# Patient Record
Sex: MALE | Race: WHITE
[De-identification: names, ages, dates, MRNs, and addresses within clinical notes are randomized per-mention and may not be internally consistent; named-entity substitution may affect disease eponyms.]

---

## 2017-03-17 ENCOUNTER — HOSPITAL ENCOUNTER (OUTPATIENT)
Dept: HOSPITAL 45 - X.SURG | Age: 37
Discharge: HOME | End: 2017-03-17
Attending: UROLOGY
Payer: COMMERCIAL

## 2017-03-17 VITALS
BODY MASS INDEX: 25.82 KG/M2 | WEIGHT: 180.38 LBS | HEIGHT: 70 IN | HEIGHT: 70 IN | BODY MASS INDEX: 25.82 KG/M2 | WEIGHT: 180.38 LBS

## 2017-03-17 VITALS — OXYGEN SATURATION: 100 % | HEART RATE: 62 BPM | SYSTOLIC BLOOD PRESSURE: 112 MMHG | DIASTOLIC BLOOD PRESSURE: 76 MMHG

## 2017-03-17 VITALS — TEMPERATURE: 96.98 F

## 2017-03-17 DIAGNOSIS — N20.0: Primary | ICD-10-CM

## 2017-03-17 DIAGNOSIS — G43.909: ICD-10-CM

## 2017-03-17 DIAGNOSIS — N20.1: ICD-10-CM

## 2017-03-17 NOTE — ANESTHESIA PROGRESS NT - [facility]
Anesthesia Post Op Note


Date & Time


Mar 17, 2017 at 09:18





Vital Signs


Pain Intensity:  0





 Vital Signs Past 12 Hours








  Date Time  Temp Pulse Resp B/P Pulse Ox O2 Delivery O2 Flow Rate FiO2


 


3/17/17 08:44 36.1 60 18 118/80 100 Room Air  


 


3/17/17 08:31    112/79    


 


3/17/17 08:30 36.4 62 12 112/79 98 Room Air  


 


3/17/17 08:28  60 13     


 


3/17/17 08:28  60 13  98   


 


3/17/17 08:26    115/80    


 


3/17/17 08:23  64 15     


 


3/17/17 08:23  60 15  100   


 


3/17/17 08:21    107/73    


 


3/17/17 08:18  59 11     


 


3/17/17 08:18  58 11  99   


 


3/17/17 08:16    118/80    


 


3/17/17 08:13  54 12  99   


 


3/17/17 08:13  57 12     


 


3/17/17 08:11    117/78    


 


3/17/17 08:08 36.4 78 12 115/81 99 Room Air  


 


3/17/17 08:08  75      


 


3/17/17 08:08  75  115/81 99   


 


3/17/17 06:40 36.5 70 16 117/76 97 Room Air  











Notes


Mental Status:  alert / awake / arousable, participated in evaluation


Pt Amnestic to Procedure:  Yes


Nausea / Vomiting:  adequately controlled


Pain:  adequately controlled


Airway Patency, RR, SpO2:  stable & adequate


BP & HR:  stable & adequate


Hydration State:  stable & adequate


Anesthetic Complications:  no major complications apparent

## 2017-03-17 NOTE — [facility] HISTORY AND PHYSICAL
History


General


Date of Service:


Mar 17, 2017.


Chief Complaint:  right kidney stone


Primary Care Physician:


Yohan Parra D.O.


Pt seen a urologist before?:  Yes


If yes, why?:  r kidney stone





History of Present Illness


Today patient presents for treatment of right kidney stone.  He has had others 

he passed on his own.  He has a 7mm largest right renal stone.





Imaging


Imaging:  KUB





Laboratory


Labs were reviewed and are within normal limits unless listed below. Labs are 

available in the chart and at [facility]





Problem List


Medical Problems:


(1) Right ureteral calculus


Status: Acute  











Past History


Past Medical History:  migraines


Past Surgical History:  no surgical history





Family History


no pertinent family hx





Social History


Hx Tobacco Use In Past Year?:  No (QUIT 1 YEAR AGO)


Smoking Status:  Former Smoker


Alcohol:  never


Marital status:  


Occupation status:  unemployed





Allergies


Coded Allergies:  


     No Known Allergies (Unverified , 3/17/17)





Medications


Home Medications:





Home Meds and Scripts








 Medications  Dose


 Route/Sig


 Max Daily Dose Days Date Category


 


 Propranolol Hcl


 40 Mg Tab  1 Tab


 PO BID


    12/8/16 Reported








Inpatient Medications:





 Current Inpatient Medications








 Medications


  (Trade)  Dose


 Ordered  Sig/Denise


 Route  Start Time


 Stop Time Status Last Admin


Dose Admin


 


 Lactated Ringer's


  (Lr 1000ml)  1,000 ml @ 


 15 mls/hr  Q24H


 IV  3/17/17 06:45


 3/18/17 06:44   


 











Review of Systems


Review of Systems


Constitutional:  No fever


Gastrointestinal:  + abdominal pain


Cardiovascular:  No chest pain


Respiratory:  No shortness of breath





Physical Exam


Physical Exam:


General Appearance:  WD/WN, no apparent distress, + thin


Eyes:  bilateral eyes normal inspection


ENT:  hearing grossly normal


Respiratory/Chest:  lungs clear, normal breath sounds, no respiratory distress, 

no accessory muscle use


Cardiovascular:  regular rate, rhythm, no JVD, no murmur


Extremities:  non-tender, normal inspection, no pedal edema, no calf tenderness


Neurologic/Psychiatric:  alert, normal mood/affect, oriented x 3


Skin:  normal color, warm/dry, no rash





Assessment & Plan


Assessment & Plan


Assessment & Plan:


right kidney stones


plan right shock wave lithotripsy


He understands risks of bleeding failure pain kidney bruise.

## 2017-03-17 NOTE — [facility] OPERATIVE REPORT
Operative Report


Operative Date


Mar 17, 2017.





Pre-Operative Diagnosis





Right Renal Calculi





Post-Operative Diagnosis


same





Procedure(s) Performed


right shock wave lithotripsy





Surgeon


Dr. Salazar





Assistant Surgeon(s)


None





Estimated Blood Loss


0 mL





Findings


radio-opaque right kidney 7mm renal stone





Specimens





None





Drains


none





Anesthesia


LMA





Complication(s)


None





Disposition


Recovery Room / PACU





Indications


large right renal stone 


plan treatment as size makes it unlikely to pass





Description of Procedure


Patient was placed supine on OR table.  Fluoro confirmed stone was readily 

visible.  He had general LMA anesthesia and was placed right flank against the 

lithotriptor head.  Time out held with team.  We performed right ESWL up to 

2500 shocks aimed at the right renal stone. It lightened marginally during 

case.  We observed a 2 minute pause after the first 200 shocks and then 

gradually increased the power from 2 to 4.  At end of case he has very mild 

petechia at the right flank energy treatment site.  He extubated uneventfully 

and transferred in stable condition to recovery room.  





Plan:





home today


oral pain meds as needed.  


strain urine until a few fragments are recovered.


rtc 1 month with KUB





clean case


no antibiotic on call


asa 2


I attest to the content of the Intraoperative Record and any orders documented 

therein.  Any exceptions are noted below.

## 2017-03-17 NOTE — DISCHARGE INSTRUCTIONS
Discharge Instructions


Date of Service


Mar 17, 2017.





Admission


Reason for Admission:  Right Kidney Stone





Discharge


Discharge Diagnosis / Problem:  right kidney stones





Discharge Goals


Goal(s):  Decrease discomfort, Improve disease control





Activity Recommendations


Activity Limitations:  resume your previous activity


Lifting Limitations:  none


Exercise/Sports Limitations:  gradually increase as tolerated


May Resume Sexual Activity:  when tolerated


Shower/Bathe:  no limitations


Driving or Machine Use:  resume 1 day after discharge





.





Instructions / Follow-Up


Instructions / Follow-Up


expect blood in urine for days to weeks


expect moderate pain right flank for several days


go to ER for uncontrolled pain, continuous vomiting, or fever over 100F


Use tylenol or ibuprofen every 6 hours for mild pain


use oxycodone every 6 hours for moderate pain





Discharge Diet


Recommended Diet:  Regular Diet


Fluid Restriction:  None





Procedures


Procedures Performed:  


Right Extracorporeal Shock Wave Lithotripsy





Pending Studies


Studies pending at discharge:  no





Medical Emergencies








.


Who to Call and When:





Medical Emergencies:  If at any time you feel your situation is an emergency, 

please call 911 immediately.





.





Non-Emergent Contact


Non-Emergency issues call your:  Urologist ()


Call Non-Emergent contact if:  temperature is above 100.5, your pain is not 

controlled





.


.





"Provider Documentation" section prepared by Jaleesa Salazar.





VTE Core Measure


Inpt VTE Proph given/why not?:  SCD's

## 2018-08-01 ENCOUNTER — HOSPITAL ENCOUNTER (INPATIENT)
Dept: HOSPITAL 45 - C.EDB | Age: 38
LOS: 2 days | Discharge: HOME | DRG: 872 | End: 2018-08-03
Attending: FAMILY MEDICINE | Admitting: FAMILY MEDICINE
Payer: COMMERCIAL

## 2018-08-01 VITALS
HEART RATE: 101 BPM | OXYGEN SATURATION: 97 % | TEMPERATURE: 98.42 F | DIASTOLIC BLOOD PRESSURE: 87 MMHG | SYSTOLIC BLOOD PRESSURE: 131 MMHG

## 2018-08-01 VITALS
HEIGHT: 70 IN | BODY MASS INDEX: 26.67 KG/M2 | WEIGHT: 186.29 LBS | BODY MASS INDEX: 26.67 KG/M2 | WEIGHT: 186.29 LBS | HEIGHT: 70 IN

## 2018-08-01 DIAGNOSIS — G43.909: ICD-10-CM

## 2018-08-01 DIAGNOSIS — Z91.048: ICD-10-CM

## 2018-08-01 DIAGNOSIS — Z83.6: ICD-10-CM

## 2018-08-01 DIAGNOSIS — A41.9: Primary | ICD-10-CM

## 2018-08-01 DIAGNOSIS — D75.9: ICD-10-CM

## 2018-08-01 DIAGNOSIS — E11.65: ICD-10-CM

## 2018-08-01 DIAGNOSIS — Z82.49: ICD-10-CM

## 2018-08-01 DIAGNOSIS — J36: ICD-10-CM

## 2018-08-01 DIAGNOSIS — Z83.3: ICD-10-CM

## 2018-08-01 LAB
ALBUMIN SERPL-MCNC: 4.2 GM/DL (ref 3.4–5)
ALP SERPL-CCNC: 88 U/L (ref 45–117)
ALT SERPL-CCNC: 27 U/L (ref 12–78)
AST SERPL-CCNC: 17 U/L (ref 15–37)
BASOPHILS # BLD: 0.02 K/UL (ref 0–0.2)
BASOPHILS NFR BLD: 0.1 %
BUN SERPL-MCNC: 16 MG/DL (ref 7–18)
CALCIUM SERPL-MCNC: 8.8 MG/DL (ref 8.5–10.1)
CO2 SERPL-SCNC: 26 MMOL/L (ref 21–32)
CREAT SERPL-MCNC: 1.3 MG/DL (ref 0.6–1.4)
EOS ABS #: 0.01 K/UL (ref 0–0.5)
EOSINOPHIL NFR BLD AUTO: 97 K/UL (ref 130–400)
GLUCOSE SERPL-MCNC: 114 MG/DL (ref 70–99)
HCT VFR BLD CALC: 45.8 % (ref 42–52)
HGB BLD-MCNC: 15.7 G/DL (ref 14–18)
IG#: 0.07 K/UL (ref 0–0.02)
IMM GRANULOCYTES NFR BLD AUTO: 8.6 %
LYMPHOCYTES # BLD: 1.48 K/UL (ref 1.2–3.4)
MCH RBC QN AUTO: 27.6 PG (ref 25–34)
MCHC RBC AUTO-ENTMCNC: 34.3 G/DL (ref 32–36)
MCV RBC AUTO: 80.6 FL (ref 80–100)
MONO ABS #: 1.13 K/UL (ref 0.11–0.59)
MONOCYTES NFR BLD: 6.5 %
NEUT ABS #: 14.59 K/UL (ref 1.4–6.5)
NEUTROPHILS # BLD AUTO: 0.1 %
NEUTROPHILS NFR BLD AUTO: 84.3 %
PMV BLD AUTO: 10.1 FL (ref 7.4–10.4)
POTASSIUM SERPL-SCNC: 3.9 MMOL/L (ref 3.5–5.1)
PROT SERPL-MCNC: 8 GM/DL (ref 6.4–8.2)
RED CELL DISTRIBUTION WIDTH CV: 13.5 % (ref 11.5–14.5)
RED CELL DISTRIBUTION WIDTH SD: 39.7 FL (ref 36.4–46.3)
SODIUM SERPL-SCNC: 136 MMOL/L (ref 136–145)
WBC # BLD AUTO: 17.3 K/UL (ref 4.8–10.8)

## 2018-08-01 RX ADMIN — ALUMINUM ZIRCONIUM TRICHLOROHYDREX GLY SCH EA: 0.2 STICK TOPICAL at 23:33

## 2018-08-01 NOTE — DIAGNOSTIC IMAGING REPORT
CHEST ONE VIEW PORTABLE



CLINICAL HISTORY: Fever, sepsis    



COMPARISON STUDY:  No previous studies for comparison.



FINDINGS: The cardiac and mediastinal contours are normal. There is no evidence

of focal pulmonary consolidation. There is no evidence of failure. No pleural

effusions are visualized.[ 



IMPRESSION: No active disease in the chest.







Electronically signed by:  Jony Villalta M.D.

8/1/2018 6:47 PM



Dictated Date/Time:  8/1/2018 6:46 PM

## 2018-08-01 NOTE — DIAGNOSTIC IMAGING REPORT
CT SOFT TISSUE NECK WITH



CT DOSE: 432.67 mGy.cm



CLINICAL HISTORY: Sore throat and fever. Possible right-sided peritonsillar

abscess.    



TECHNIQUE: Helical images were acquired during intravenous administration of 115

cc of Optiray 320.  A dose lowering technique was utilized adhering to the

principles of ALARA.



COMPARISON STUDY:  None.



FINDINGS: The visualized portions of the lung apices are unremarkable.

No thyroid masses are visualized.

No salivary gland masses are visualized.

There are enlarged right-sided level 2 lymph nodes, likely reactive.

There is a subtle 11 mm right tonsillar hypodensity, consistent with a

developing phlegmon/abscess.

There is no evidence of airway compromise.

The epiglottis appears normal.





IMPRESSION:  

1. Subtle 11 mm right tonsillar hypodensity consistent with a developing

phlegmon/abscess

2. No evidence of significant airway compromise

3. Mildly enlarged right cervical lymph nodes, likely reactive.

4. Normal epiglottis







Electronically signed by:  Jony Villalta M.D.

8/1/2018 8:47 PM



Dictated Date/Time:  8/1/2018 8:43 PM

## 2018-08-01 NOTE — EMERGENCY ROOM VISIT NOTE
History


Report prepared by Carson:  Anita Holland


Under the Supervision of:  Dr. Tyrel Segura M.D.


First contact with patient:  18:00


Chief Complaint:  ILLNESS


Stated Complaint:  COLD, SORE THROAT, SHAKING





History of Present Illness


The patient is a 38 year old male who presents to the Emergency Room with 

complaints of a constant sore throat beginning yesterday. He notes the pain is 

worst on the R side of his throat, and is accompanied by shaking and some mild 

ear pain. The patient denies a runny nose, cough, difficulty breathing, nausea, 

vomiting, or bowel movement or urinary symptoms. He reports he has been taking 

Tylenol for his symptoms, and last had it 6 hours ago. The patient notes he is 

having some difficulty swallowing. Decreased PO intake. No trauma. No thyroid 

history.





   Source of History:  patient


   Onset:  yesterday


   Position:  throat


   Quality:  other (sore throat)


   Timing:  constant


   Associated Symptoms:  No cough, No nausea, No urinary symptoms


Note:


Associated symptoms: mild ear pain, difficulty swallowing. Denies: runny nose, 

difficulty breathing, bowel movement symptoms.





Review of Systems


See HPI for pertinent positives and negatives.  A total of ten systems were 

reviewed and were otherwise negative.





Past Medical & Surgical


Medical Problems:


(1) Migraine Unspecified W/O Intract Mgrn W/O Status Migrainosus


(2) Renal colic on right side


(3) Sepsis








Family History





Diabetes mellitus


FH: cancer


FH: heart disease


Hypertension


Kidney disease





Social History


Smoking Status:  Never Smoker


Alcohol Use:  none


Marital Status:  


Housing Status:  lives with family


Occupation Status:  unemployed





Current/Historical Medications


Scheduled PRN


Acetaminophen (Tylenol), 500 MG PO UD PRN for Pain or Fever


Eletriptan Hydrobromide (Eletriptan Hydrobromide), 40 MG PO UD PRN for Migraine





Allergies


Coded Allergies:  


     No Known Allergies (Unverified , 3/17/17)





Physical Exam


Vital Signs











  Date Time  Temp Pulse Resp B/P (MAP) Pulse Ox O2 Delivery O2 Flow Rate FiO2


 


8/1/18 21:36 37.7 101 18 141/79 96 Room Air  


 


8/1/18 20:24  115 20 121/74 96 Room Air  


 


8/1/18 20:24 39.0       


 


8/1/18 19:27 39.7       


 


8/1/18 19:13  132 26 117/77 96 Room Air  


 


8/1/18 18:58  136 24  96 Room Air  


 


8/1/18 18:24      Room Air  


 


8/1/18 17:55 39.4 154 20 138/79 96 Room Air  











Physical Exam


GENERAL: Awake, alert, well-appearing, appears moderately uncomfortable


HENT: Normocephalic, atraumatic. R tonsillar swelling and exudate. 


EYES: Normal conjunctiva. Sclera non-icteric.


NECK: Supple with slight right tenderness at angle of right jaw. No nuchal 

rigidity. 


RESPIRATORY: Clear to auscultation. No wheezes. Normal respiratory effort.


CARDIAC: Tachycardic rate.  Normal rhythm. Extremities warm and well perfused.


GI: Soft, non-distended. No tenderness to palpation. No rebound or guarding. No 

masses.


RECTAL: Deferred.


MUSCULOSKELETAL: Atraumatic. Chest examination reveals no tenderness. There is 

no CVA tenderness to palpation. 


LOWER EXTREMITIES: Calves are equal size bilaterally and non-tender. No edema


NEURO: Normal sensorium. No sensory or motor deficits noted. No facial droop.


SKIN: Warm and dry. No rash or jaundice noted.





Medical Decision & Procedures


ER Provider


Diagnostic Interpretation:


Radiology results as stated below per my review and radiologist interpretation: 





CT SOFT TISSUE NECK WITH





CT DOSE: 432.67 mGy.cm





CLINICAL HISTORY: Sore throat and fever. Possible right-sided peritonsillar


abscess.    





TECHNIQUE: Helical images were acquired during intravenous administration of 115


cc of Optiray 320.  A dose lowering technique was utilized adhering to the


principles of ALARA.





COMPARISON STUDY:  None.





FINDINGS: The visualized portions of the lung apices are unremarkable.


No thyroid masses are visualized.


No salivary gland masses are visualized.


There are enlarged right-sided level 2 lymph nodes, likely reactive.


There is a subtle 11 mm right tonsillar hypodensity, consistent with a


developing phlegmon/abscess.


There is no evidence of airway compromise.


The epiglottis appears normal.





IMPRESSION:  


1. Subtle 11 mm right tonsillar hypodensity consistent with a developing


phlegmon/abscess


2. No evidence of significant airway compromise


3. Mildly enlarged right cervical lymph nodes, likely reactive.


4. Normal epiglottis





Electronically signed by:  Jony Villalta M.D.


8/1/2018 8:47 PM





Dictated Date/Time:  8/1/2018 8:43 PM





CHEST ONE VIEW PORTABLE





CLINICAL HISTORY: Fever, sepsis    





COMPARISON STUDY:  No previous studies for comparison.





FINDINGS: The cardiac and mediastinal contours are normal. There is no evidence


of focal pulmonary consolidation. There is no evidence of failure. No pleural


effusions are visualized.





IMPRESSION: No active disease in the chest.





Electronically signed by:  Jony Villalta M.D.


8/1/2018 6:47 PM





Dictated Date/Time:  8/1/2018 6:46 PM





Laboratory Results


8/1/18 18:10








Red Blood Count 5.68, Mean Corpuscular Volume 80.6, Mean Corpuscular Hemoglobin 

27.6, Mean Corpuscular Hemoglobin Concent 34.3, Mean Platelet Volume 10.1, 

Neutrophils (%) (Auto) 84.3, Lymphocytes (%) (Auto) 8.6, Monocytes (%) (Auto) 

6.5, Eosinophils (%) (Auto) 0.1, Basophils (%) (Auto) 0.1, Neutrophils # (Auto) 

14.59, Lymphocytes # (Auto) 1.48, Monocytes # (Auto) 1.13, Eosinophils # (Auto) 

0.01, Basophils # (Auto) 0.02





8/1/18 18:10

















Test


  8/1/18


18:10 8/1/18


21:45


 


White Blood Count


  17.30 K/uL


(4.8-10.8) 


 


 


Red Blood Count


  5.68 M/uL


(4.7-6.1) 


 


 


Hemoglobin


  15.7 g/dL


(14.0-18.0) 


 


 


Hematocrit 45.8 % (42-52)  


 


Mean Corpuscular Volume


  80.6 fL


() 


 


 


Mean Corpuscular Hemoglobin


  27.6 pg


(25-34) 


 


 


Mean Corpuscular Hemoglobin


Concent 34.3 g/dl


(32-36) 


 


 


Platelet Count


  97 K/uL


(130-400) 


 


 


Mean Platelet Volume


  10.1 fL


(7.4-10.4) 


 


 


Neutrophils (%) (Auto) 84.3 %  


 


Lymphocytes (%) (Auto) 8.6 %  


 


Monocytes (%) (Auto) 6.5 %  


 


Eosinophils (%) (Auto) 0.1 %  


 


Basophils (%) (Auto) 0.1 %  


 


Neutrophils # (Auto)


  14.59 K/uL


(1.4-6.5) 


 


 


Lymphocytes # (Auto)


  1.48 K/uL


(1.2-3.4) 


 


 


Monocytes # (Auto)


  1.13 K/uL


(0.11-0.59) 


 


 


Eosinophils # (Auto)


  0.01 K/uL


(0-0.5) 


 


 


Basophils # (Auto)


  0.02 K/uL


(0-0.2) 


 


 


RDW Standard Deviation


  39.7 fL


(36.4-46.3) 


 


 


RDW Coefficient of Variation


  13.5 %


(11.5-14.5) 


 


 


Immature Granulocyte % (Auto) 0.4 %  


 


Immature Granulocyte # (Auto)


  0.07 K/uL


(0.00-0.02) 


 


 


Platelet Estimate DECREASED  


 


Red Blood Cell Morphology Unremarkable  


 


Anion Gap


  7.0 mmol/L


(3-11) 


 


 


Est Creatinine Clear Calc


Drug Dose 81.1 ml/min 


  


 


 


Estimated GFR (


American) 81.4 


  


 


 


Estimated GFR (Non-


American 70.2 


  


 


 


BUN/Creatinine Ratio 11.9 (10-20)  


 


Calcium Level


  8.8 mg/dl


(8.5-10.1) 


 


 


Magnesium Level


  2.1 mg/dl


(1.8-2.4) 


 


 


Total Bilirubin


  0.7 mg/dl


(0.2-1) 


 


 


Direct Bilirubin


  0.2 mg/dl


(0-0.2) 


 


 


Aspartate Amino Transf


(AST/SGOT) 17 U/L (15-37) 


  


 


 


Alanine Aminotransferase


(ALT/SGPT) 27 U/L (12-78) 


  


 


 


Alkaline Phosphatase


  88 U/L


() 


 


 


C-Reactive Protein


  16.80 mg/dl


(0-0.29) 


 


 


Total Protein


  8.0 gm/dl


(6.4-8.2) 


 


 


Albumin


  4.2 gm/dl


(3.4-5.0) 


 


 


Thyroid Stimulating Hormone


(TSH) 0.340 uIu/ml


(0.300-4.500) 


 


 


Monoscreen NEG (NEG)  


 


Lactic Acid Level


  


  0.7 mmol/L


(0.4-2.0)





Laboratory results reviewed by me





Medications Administered











 Medications


  (Trade)  Dose


 Ordered  Sig/Denise


 Route  Start Time


 Stop Time Status Last Admin


Dose Admin


 


 Acetaminophen


  (Tylenol Tab)  1,000 mg  NOW  STAT


 PO  8/1/18 18:04


 8/1/18 18:08 DC 8/1/18 18:23


1,000 MG


 


 Sodium Chloride  500 ml @ 


 999 mls/hr  Q31M ONCE


 IV  8/1/18 18:04


 8/1/18 18:34 DC 8/1/18 18:04


999 MLS/HR


 


 Sodium Chloride  500 ml @ 


 999 mls/hr  Q31M ONCE


 IV  8/1/18 18:04


 8/1/18 18:34 DC 8/1/18 18:04


999 MLS/HR


 


 Ampicillin Sodium/


 Sulbactam Sodium


 3000 mg/Sodium


 Chloride  108 ml @ 


 200 mls/hr  NOW  STAT


 IV  8/1/18 18:45


 8/1/18 19:17 DC 8/1/18 19:14


200 MLS/HR


 


 Ketorolac


 Tromethamine


  (Toradol Inj)  15 mg  NOW  STAT


 IV  8/1/18 19:33


 8/1/18 19:34 DC 8/1/18 19:40


15 MG


 


 Dexamethasone


 Sodium Phosphate


  (Dexamethasone


 Inj **Pf**)  10 mg  NOW  ONCE


 PO  8/1/18 21:30


 8/1/18 21:32 DC 8/1/18 21:36


10 MG


 


 Sodium Chloride  1,000 ml @ 


 999 mls/hr  Q1H1M ONCE


 IV  8/1/18 21:32


 8/1/18 22:32 DC 8/1/18 21:36


999 MLS/HR











ECG Per My Interpretation


Indication:  tachycardia


Rate (beats per minute):  144


Findings:  other (normal axis. normal intercals. no ST el)





ED Course


1800: The patient was evaluated in room C1B. A complete history and physical 

exam was performed.





1804: Ordered Sodium Chloride 500 ml @ 999 mls/hr IV,  Sodium Chloride 500 ml @ 

999 mls/hr IV, Tylenol Tab 1000 mg PO





1815: Ordered Ioversol 100 ml IV





1827: Throat swabs obtained. 





1845: Ordered Ampicillin Sodium/Sulbactam Sodium 3000 mg/Sodium Chloride 108 ml 

@ 200 mls/hr IV





1928: I reevaluated and updated the patient.





1933: Ordered Toradol Inj 15 mg IV.





2116: I reevaluated and updated the patient. 





2130: Ordered Dexamethasone Sodium Phosphate 10 mg PO.





2132: Ordered Sodium Chloride 1000 ml @ 999 mls/hr IV.





2136: I discussed the case with Dr. Chisholm, [facility] hospitalist. The patient 

will be evaluated for further treatment and disposition.





Medical Decision


Etiologies such as viral syndrome, tonsillitis, streptococcal pharyngitis, 

mononucleosis, peritonsillar abscess, retropharyngeal abscess, otitis, pneumonia

, influenza, as well as others were entertained.





Patient presents with neck pain and fever starting yesterday.  Evidence of 

right tonsillar swelling.  No nausea vomiting or shortness of breath.  Does not 

appear grossly meningitic.  Tachycardic.  Lactate borderline elevation.  

Believe he likely has pharyngitis versus neck infection versus PTA. Doubt 

meningitis. Chest x-ray clear.  Benign abdomen.  Fluid rehydrated.  Culture 

sent.  Rapid strep and mono sent.  Mono negative.  Leukocytosis is present.  

Given Tylenol and Toradol for fever.  Patient's fever improved slightly and 

tachycardia improved slightly and he is more comfortable but still febrile and 

tachycardic with leukocytosis.  Has signs of sepsis but not organ dysfunction. 

Second lactate improved. Not in septic shock.  Given steroids and again 

received Unasyn.  Will monitor overnight as he developing right peritonsillar 

phlegmon/early abscess.  This is not amenable to draining at this time.  No 

signs of airway compromise.  Will admit overnight and the hospitalist was 

contacted.





Medication Reconcilliation


Current Medication List:  was personally reviewed by me





Blood Pressure Screening


Patient's blood pressure:  Normal blood pressure


Blood pressure disposition:  Did not require urgent referral





Consults


Time Called:  2131


Consulting Physician:  Jay Butcher hospitalist


Returned Call:  [phone]


I discussed the case with Jay Butcher [facility] Hospitalist. The patient will 

be evaluated for further treatment and disposition.





Impression





 Primary Impression:  


 Peritonsillar abscess


 Additional Impression:  


 Sepsis





Critical Care


I have personally spent greater than 31 minutes of critical care time in the 

direct management of this patient.  This includes bedside care, interpretation 

of diagnostic studies, and testing, discussion with consultants, patient, and 

family members, and other required patient management activities.  This 31 

minutes is in excess of all separately billable procedures.





Scribe Attestation


The scribe's documentation has been prepared under my direction and personally 

reviewed by me in its entirety. I confirm that the note above accurately 

reflects all work, treatment, procedures, and medical decision making performed 

by me.





Departure Information


Dispostion


Being Evaluated By Hospitalist





Referrals


Yohan Parra D.OCristofer (PCP)





Patient Instructions


My [facility]





Sepsis


Post Crystalloid Evaluation


Date:  Aug 1, 2018


Time:  21:16





Capillary Refill Exam


Normal (less than 2 seconds)





Cardiopulmonary Evaluation


Lung Exam:  chest non-tender, lungs clear, normal breath sounds, no respiratory 

distress, no accessory muscle use


Heart Exam:  no edema, no gallop, no JVD, no murmur, normal peripheral pulses, 

+ tachycardia





Central Venous Evaluation


Pulse Ox %:  96





Peripheral Pulse Evaluation


Bounding





Skin Exam


Pink





Vitals





Last Vital Signs Documentation








  Date Time  Temp Pulse Resp B/P (MAP) Pulse Ox O2 Delivery O2 Flow Rate FiO2


 


8/1/18 21:36 37.7 101 18 141/79 96 Room Air  











Problem Qualifiers








 Additional Impression:  


 Sepsis


 Sepsis type:  sepsis due to unspecified organism  Qualified Codes:  A41.9 - 

Sepsis, unspecified organism

## 2018-08-02 VITALS
DIASTOLIC BLOOD PRESSURE: 74 MMHG | OXYGEN SATURATION: 97 % | SYSTOLIC BLOOD PRESSURE: 120 MMHG | HEART RATE: 91 BPM | TEMPERATURE: 97.88 F

## 2018-08-02 VITALS
TEMPERATURE: 98.06 F | OXYGEN SATURATION: 97 % | DIASTOLIC BLOOD PRESSURE: 78 MMHG | HEART RATE: 94 BPM | SYSTOLIC BLOOD PRESSURE: 109 MMHG

## 2018-08-02 VITALS
TEMPERATURE: 98.6 F | OXYGEN SATURATION: 98 % | HEART RATE: 89 BPM | DIASTOLIC BLOOD PRESSURE: 82 MMHG | SYSTOLIC BLOOD PRESSURE: 130 MMHG

## 2018-08-02 VITALS — OXYGEN SATURATION: 97 %

## 2018-08-02 LAB
BASOPHILS # BLD: 0.01 K/UL (ref 0–0.2)
BASOPHILS NFR BLD: 0.1 %
CREAT SERPL-MCNC: 0.89 MG/DL (ref 0.6–1.4)
EOS ABS #: 0 K/UL (ref 0–0.5)
EOSINOPHIL NFR BLD AUTO: 78 K/UL (ref 130–400)
HBA1C MFR BLD: 5 % (ref 4.5–5.6)
HCT VFR BLD CALC: 40.8 % (ref 42–52)
HGB BLD-MCNC: 13.9 G/DL (ref 14–18)
IG#: 0.07 K/UL (ref 0–0.02)
IMM GRANULOCYTES NFR BLD AUTO: 4.6 %
LYMPHOCYTES # BLD: 0.78 K/UL (ref 1.2–3.4)
MCH RBC QN AUTO: 27.6 PG (ref 25–34)
MCHC RBC AUTO-ENTMCNC: 34.1 G/DL (ref 32–36)
MCV RBC AUTO: 81 FL (ref 80–100)
MONO ABS #: 0.6 K/UL (ref 0.11–0.59)
MONOCYTES NFR BLD: 3.5 %
NEUT ABS #: 15.59 K/UL (ref 1.4–6.5)
NEUTROPHILS # BLD AUTO: 0 %
NEUTROPHILS NFR BLD AUTO: 91.4 %
PMV BLD AUTO: 9.6 FL (ref 7.4–10.4)
RED CELL DISTRIBUTION WIDTH CV: 13.8 % (ref 11.5–14.5)
RED CELL DISTRIBUTION WIDTH SD: 40.6 FL (ref 36.4–46.3)
WBC # BLD AUTO: 17.05 K/UL (ref 4.8–10.8)

## 2018-08-02 RX ADMIN — ALUMINUM ZIRCONIUM TRICHLOROHYDREX GLY SCH EA: 0.2 STICK TOPICAL at 08:00

## 2018-08-02 RX ADMIN — AMPICILLIN SODIUM AND SULBACTAM SODIUM SCH MLS/HR: 2; 1 INJECTION, POWDER, FOR SOLUTION INTRAMUSCULAR; INTRAVENOUS at 07:40

## 2018-08-02 RX ADMIN — AMPICILLIN SODIUM AND SULBACTAM SODIUM SCH MLS/HR: 2; 1 INJECTION, POWDER, FOR SOLUTION INTRAMUSCULAR; INTRAVENOUS at 20:10

## 2018-08-02 RX ADMIN — AMPICILLIN SODIUM AND SULBACTAM SODIUM SCH MLS/HR: 2; 1 INJECTION, POWDER, FOR SOLUTION INTRAMUSCULAR; INTRAVENOUS at 13:38

## 2018-08-02 RX ADMIN — ALUMINUM ZIRCONIUM TRICHLOROHYDREX GLY SCH EA: 0.2 STICK TOPICAL at 16:00

## 2018-08-02 RX ADMIN — KETOROLAC TROMETHAMINE PRN MG: 15 INJECTION INTRAMUSCULAR; INTRAVENOUS at 05:46

## 2018-08-02 RX ADMIN — AMPICILLIN SODIUM AND SULBACTAM SODIUM SCH MLS/HR: 2; 1 INJECTION, POWDER, FOR SOLUTION INTRAMUSCULAR; INTRAVENOUS at 01:41

## 2018-08-02 RX ADMIN — ALUMINUM ZIRCONIUM TRICHLOROHYDREX GLY SCH EA: 0.2 STICK TOPICAL at 23:53

## 2018-08-02 NOTE — PROGRESS NOTE
Subjective


Date of Service:


Aug 2, 2018.


Subjective


Pt evaluation today including:  conversation w/ patient, physical exam, lab 

review, review of studies, conversation w/ consultant, review of inpatient 

medication list


Saw/examined the patient in room 454


He's doing well, no problems/issues at this time


throat pain improving; states he's had throat pain once or twice every year and 

is wondering about getting his tonsils removed


fevers improved, tolerated clears





Problem List


Medical Problems:


(1) Peritonsillar abscess


Status: Acute  





(2) Right ureteral calculus


Status: Acute  











Review of Systems


Constitutional:  No fever (resolved), No chills


ENT:  + sore throat (improving), + trouble swallowing


Respiratory:  No cough, No sputum, No wheezing, No shortness of breath, No 

dyspnea on exertion, No dyspnea at rest, No hemoptysis


Cardiac:  No chest pain, No edema, No palpitations


Abdomen:  No pain, No nausea, No vomiting, No diarrhea, No constipation, No GI 

bleeding





Medications





Current Inpatient Medications








 Medications


  (Trade)  Dose


 Ordered  Sig/Denise


 Route  Start Time


 Stop Time Status Last Admin


Dose Admin


 


 Ioversol


  (Optiray 320)  100 ml  UD  PRN


 IV  8/1/18 18:15


 8/5/18 18:14   


 


 


 Acetaminophen


  (Tylenol Tab)  650 mg  Q4H  PRN


 PO  8/1/18 22:30


 8/31/18 22:29   


 


 


 Ampicillin Sodium/


 Sulbactam Sodium


  (Consult)  1 ea  UD  PRN


 N/A  8/1/18 23:00


 8/31/18 22:59   


 


 


 Ketorolac


 Tromethamine


  (Toradol Inj)  15 mg  Q6H  PRN


 IV.  8/1/18 22:30


 8/6/18 22:29  8/2/18 05:46


15 MG


 


 Ibuprofen


  (Advil Tab)  400 mg  Q6H  PRN


 PO  8/1/18 22:30


 8/31/18 22:29   


 


 


 Prochlorperazine


 Edisylate 5 mg/


 Syringe  5 ml @ 5


 mls/min  Q6H  PRN


 IV  8/1/18 22:30


 8/31/18 22:29   


 


 


 Tramadol HCl


  (Ultram Tab)  not


 relieved


 by tylenol @   Q6H  PRN


 PO  8/1/18 22:30


 8/31/18 22:29   


 


 


 Miscellaneous


 Information


  (Order Awaiting


 Action)  1 ea  QS


 N/A  8/2/18 00:00


 9/1/18 00:00   


 


 


 Nortriptyline HCl


  (Pamelor Cap)  50 mg  HS


 PO  8/2/18 21:00


 9/1/18 20:59   


 


 


 Ampicillin Sodium/


 Sulbactam Sodium


 3000 mg/Sodium


 Chloride  108 ml @ 


 216 mls/hr  Q6H


 IV  8/2/18 02:00


 8/12/18 01:59  8/2/18 13:38


216 MLS/HR











Objective


Vital Signs











  Date Time  Temp Pulse Resp B/P (MAP) Pulse Ox O2 Delivery O2 Flow Rate FiO2


 


8/2/18 08:00      Room Air  


 


8/2/18 07:16 36.6 91 16 120/74 (89) 97 Room Air  


 


8/2/18 00:00      Room Air  


 


8/1/18 22:36 36.9 101 16 131/87 97 Room Air  


 


8/1/18 22:25  101 18 135/89 97   


 


8/1/18 21:36 37.7 101 18 141/79 96 Room Air  


 


8/1/18 20:24  115 20 121/74 96 Room Air  


 


8/1/18 20:24 39.0       


 


8/1/18 19:27 39.7       


 


8/1/18 19:13  132 26 117/77 96 Room Air  


 


8/1/18 18:58  136 24  96 Room Air  


 


8/1/18 18:24      Room Air  


 


8/1/18 17:55 39.4 154 20 138/79 96 Room Air  











Physical Exam


General Appearance:  no apparent distress


ENT:  + pharyngeal erythema, + tonsillar exudate


Respiratory/Chest:  chest non-tender, lungs clear, normal breath sounds, no 

respiratory distress, no accessory muscle use


Cardiovascular:  regular rate, rhythm, no edema, no murmur


Abdomen:  normal bowel sounds, non tender, soft


Extremities:  normal inspection, no pedal edema





Laboratory Results





Last 24 Hours








Test


  8/1/18


18:10 8/1/18


18:20 8/1/18


21:45 8/1/18


23:35


 


White Blood Count 17.30 K/uL    


 


Red Blood Count 5.68 M/uL    


 


Hemoglobin 15.7 g/dL    


 


Hematocrit 45.8 %    


 


Mean Corpuscular Volume 80.6 fL    


 


Mean Corpuscular Hemoglobin 27.6 pg    


 


Mean Corpuscular Hemoglobin


Concent 34.3 g/dl 


  


  


  


 


 


Platelet Count 97 K/uL    


 


Mean Platelet Volume 10.1 fL    


 


Neutrophils (%) (Auto) 84.3 %    


 


Lymphocytes (%) (Auto) 8.6 %    


 


Monocytes (%) (Auto) 6.5 %    


 


Eosinophils (%) (Auto) 0.1 %    


 


Basophils (%) (Auto) 0.1 %    


 


Neutrophils # (Auto) 14.59 K/uL    


 


Lymphocytes # (Auto) 1.48 K/uL    


 


Monocytes # (Auto) 1.13 K/uL    


 


Eosinophils # (Auto) 0.01 K/uL    


 


Basophils # (Auto) 0.02 K/uL    


 


RDW Standard Deviation 39.7 fL    


 


RDW Coefficient of Variation 13.5 %    


 


Immature Granulocyte % (Auto) 0.4 %    


 


Immature Granulocyte # (Auto) 0.07 K/uL    


 


Platelet Estimate DECREASED    


 


Red Blood Cell Morphology Unremarkable    


 


Sodium Level 136 mmol/L    


 


Potassium Level 3.9 mmol/L    


 


Chloride Level 103 mmol/L    


 


Carbon Dioxide Level 26 mmol/L    


 


Anion Gap 7.0 mmol/L    


 


Blood Urea Nitrogen 16 mg/dl    


 


Creatinine 1.30 mg/dl    


 


Est Creatinine Clear Calc


Drug Dose 81.1 ml/min 


  


  


  


 


 


Estimated GFR (


American) 81.4 


  


  


  


 


 


Estimated GFR (Non-


American 70.2 


  


  


  


 


 


BUN/Creatinine Ratio 11.9    


 


Random Glucose 114 mg/dl    


 


Estimated Average Glucose 97 mg/dl    


 


Hemoglobin A1c 5.0 %    


 


Calcium Level 8.8 mg/dl    


 


Magnesium Level 2.1 mg/dl    


 


Total Bilirubin 0.7 mg/dl    


 


Direct Bilirubin 0.2 mg/dl    


 


Aspartate Amino Transf


(AST/SGOT) 17 U/L 


  


  


  


 


 


Alanine Aminotransferase


(ALT/SGPT) 27 U/L 


  


  


  


 


 


Alkaline Phosphatase 88 U/L    


 


C-Reactive Protein 16.80 mg/dl    


 


Total Protein 8.0 gm/dl    


 


Albumin 4.2 gm/dl    


 


Thyroid Stimulating Hormone


(TSH) 0.340 uIu/ml 


  


  


  


 


 


Monoscreen NEG    


 


Lactic Acid Level  2.1 mmol/L  0.7 mmol/L  


 


Urine Color    DK YELLOW 


 


Urine Appearance    CLEAR 


 


Urine pH    5.5 


 


Urine Specific Gravity    > 1.045 


 


Urine Protein    TRACE 


 


Urine Glucose (UA)    NEG 


 


Urine Ketones    2+ 


 


Urine Occult Blood    1+ 


 


Urine Nitrite    NEG 


 


Urine Bilirubin    NEG 


 


Urine Urobilinogen    NEG 


 


Urine Leukocyte Esterase    NEG 


 


Urine WBC (Auto)    1-5 /hpf 


 


Urine RBC (Auto)    10-30 /hpf 


 


Urine Hyaline Casts (Auto)    0 /lpf 


 


Urine Epithelial Cells (Auto)    5-10 /lpf 


 


Urine Bacteria (Auto)    NEG 


 


Test


  8/2/18


05:30 


  


  


 


 


White Blood Count 17.05 K/uL    


 


Red Blood Count 5.04 M/uL    


 


Hemoglobin 13.9 g/dL    


 


Hematocrit 40.8 %    


 


Mean Corpuscular Volume 81.0 fL    


 


Mean Corpuscular Hemoglobin 27.6 pg    


 


Mean Corpuscular Hemoglobin


Concent 34.1 g/dl 


  


  


  


 


 


Platelet Count 78 K/uL    


 


Mean Platelet Volume 9.6 fL    


 


Neutrophils (%) (Auto) 91.4 %    


 


Lymphocytes (%) (Auto) 4.6 %    


 


Monocytes (%) (Auto) 3.5 %    


 


Eosinophils (%) (Auto) 0.0 %    


 


Basophils (%) (Auto) 0.1 %    


 


Neutrophils # (Auto) 15.59 K/uL    


 


Lymphocytes # (Auto) 0.78 K/uL    


 


Monocytes # (Auto) 0.60 K/uL    


 


Eosinophils # (Auto) 0.00 K/uL    


 


Basophils # (Auto) 0.01 K/uL    


 


RDW Standard Deviation 40.6 fL    


 


RDW Coefficient of Variation 13.8 %    


 


Immature Granulocyte % (Auto) 0.4 %    


 


Immature Granulocyte # (Auto) 0.07 K/uL    


 


Red Blood Cell Morphology Unremarkable    


 


Peripheral Blood Smear Path


Consult  


  


  


  


 


 


Creatinine 0.89 mg/dl    


 


Est Creatinine Clear Calc


Drug Dose 118.5 ml/min 


  


  


  


 


 


Estimated GFR (


American) 127.5 


  


  


  


 


 


Estimated GFR (Non-


American 110.0 


  


  


  


 











Assessment and Plan


This is a 38 year old male with a past medical history of chronic migraines and 

chronic thrombocytopenia - presents with peritonsillar abscess





Peritonsillar Abscess


- CT of the soft tissue neck shows an 11mm peritonsillar abscess


- spoke with ENT over the phone, this is unlikely to be drained


- to see ENT as an outpatient in one week


- in the meantime, will treat with antibiotics


- Unasyn while inpatient and Augmentin on discharge


- currently on clears and advance as tolerated





Chronic Thrombocytopenia


- platelets stable





Chronic Migraines


- continue home medications





DVT ppx


- ambulation





FULL CODE

## 2018-08-02 NOTE — HISTORY & PHYSICAL EXAMINATION
DATE OF ADMISSION:  08/01/2018

 

PRIMARY CARE PHYSICIAN:  Dr. Parra.

 

CHIEF COMPLAINT:  Sore throat.

 

HISTORY OF PRESENT ILLNESS:  



Medical history is significant for chronic migraine, urolithiasis, chronic 
thrombocytopenia.  



Since childhood, the patient has had recurrent sore throats (about three 
episodes a year) -

most episodes resolving without antibiotic prescription.

Last episode was  last month.  



Yesterday sore throat noted more on on the right side with R otalgia, 
odynophagia, fever,

chills.   

No chest pain, no shortness of breath.  



At the Emergency Room, patient received Unasyn for sepsis.

 

MEDICAL HISTORY:  As above.

 

SURGERIES:  Urologic procedures.

 

HOME MEDICATIONS:  Include Nortriptyline, Omeprazole, Relpax p.r.n.

 

ALLERGIES:  DUST MITE.

 

FAMILY HISTORY:  There is a family history of recurrent sore throat.

 

PERSONAL AND SOCIAL HISTORY:  Nonsmoker, no chronic intake of alcoholic

beverages.  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

 

REVIEW OF SYSTEMS:  As per HPI, all 10 systems reviewed, all other ROS

negative.

 

PHYSICAL EXAMINATION:

VITAL SIGNS:  Blood pressure was noted to be 113/79, pulse rate 154, later

101, RR 18, temperature 39.7, sats 96 on room air.

GENERAL:  Noted to be pleasant and comfortable, no respiratory distress.  No

stridor.

SKIN:  Normal color, warm.

HEENT:  Pink palpebral conjunctivae.  No ptosis, dry mucosa.  Enlarged tonsil

on the right with right peritonsillar swelling, R tonsillar exudate noted.  

No trismus. 

NECK:  Supple, some submandibular adenopathy noted.

CHEST:  Clear to auscultation, no tenderness.

HEART:  Tachycardic.  No murmur.

ABDOMEN:  Soft, nontender.

EXTREMITIES:  No edema.  No gross deformities, no tenderness.

NEUROLOGIC:  Coherent, no gross focality.

 

LABORATORY DATA:  Hemoglobin 15.7, hematocrit 45.8, white cell count 17.3,

platelets 97 (immature granulocytes 0.07, monocytes 1.13, decreased platelet

estimate).  Sodium 136, potassium 3.9, chloride 103,  BUN 60,

creatine 1.2, glucose 114.  Lactic acid was noted to be 2.1.

 



CT neck shows subtle 11-mm right tonsillar hypodensity consistent with 
developing phlegmon/abscess. 



Chest x-ray, no active disease.  



EKG as per my interpretation, rate 145, sinus tachycardia,

no ischemia.  



ASSESSMENT:

1.  Severe sepsis 

SIRS plus lactic acid elevation secondary to peritonsillar abscess.

2.  Chronic migraine, stable 

3.  Hyperglycemia, rule out DM.

4.  Chronic thrombocytopenia with abnormal WBC differential count

rule out blood dyscrasia.

 

PLAN:  

GMF

CS, IV Unasyn.

IVF, follow lactic acid

ENT consult, peritonsillar abscess, recurrent tonsillitis.  

Peripheral blood smear.  

Check hemoglobin A1c.

DVT prophylaxis, SCDs RE thrombocytopenia.  

Full code.  

 

 

 

MTDD

## 2018-08-03 VITALS
OXYGEN SATURATION: 96 % | DIASTOLIC BLOOD PRESSURE: 70 MMHG | HEART RATE: 81 BPM | SYSTOLIC BLOOD PRESSURE: 110 MMHG | TEMPERATURE: 97.88 F

## 2018-08-03 VITALS
HEART RATE: 81 BPM | DIASTOLIC BLOOD PRESSURE: 70 MMHG | SYSTOLIC BLOOD PRESSURE: 110 MMHG | OXYGEN SATURATION: 96 % | TEMPERATURE: 97.88 F

## 2018-08-03 LAB
CREAT SERPL-MCNC: 0.92 MG/DL (ref 0.6–1.4)
EOSINOPHIL NFR BLD AUTO: 107 K/UL (ref 130–400)
HCT VFR BLD CALC: 39.5 % (ref 42–52)
HGB BLD-MCNC: 13.5 G/DL (ref 14–18)
MCH RBC QN AUTO: 27.6 PG (ref 25–34)
MCHC RBC AUTO-ENTMCNC: 34.2 G/DL (ref 32–36)
MCV RBC AUTO: 80.6 FL (ref 80–100)
PMV BLD AUTO: 10.7 FL (ref 7.4–10.4)
RED CELL DISTRIBUTION WIDTH CV: 13.9 % (ref 11.5–14.5)
RED CELL DISTRIBUTION WIDTH SD: 40.6 FL (ref 36.4–46.3)
WBC # BLD AUTO: 15.67 K/UL (ref 4.8–10.8)

## 2018-08-03 RX ADMIN — AMPICILLIN SODIUM AND SULBACTAM SODIUM SCH MLS/HR: 2; 1 INJECTION, POWDER, FOR SOLUTION INTRAMUSCULAR; INTRAVENOUS at 01:38

## 2018-08-03 RX ADMIN — AMPICILLIN SODIUM AND SULBACTAM SODIUM SCH MLS/HR: 2; 1 INJECTION, POWDER, FOR SOLUTION INTRAMUSCULAR; INTRAVENOUS at 07:30

## 2018-08-03 RX ADMIN — KETOROLAC TROMETHAMINE PRN MG: 15 INJECTION INTRAMUSCULAR; INTRAVENOUS at 01:39

## 2018-08-03 RX ADMIN — ALUMINUM ZIRCONIUM TRICHLOROHYDREX GLY SCH EA: 0.2 STICK TOPICAL at 07:42

## 2018-08-03 NOTE — PROGRESS NOTE
Subjective


Date of Service:


Aug 3, 2018.


Subjective


Pt evaluation today including:  conversation w/ patient, physical exam, lab 

review, review of studies, review of inpatient medication list


Saw/examined the patient in room 454


Symptoms have improved and nearly resolved


Tolerating PO intake





Problem List


Medical Problems:


(1) Peritonsillar abscess


Status: Acute  





(2) Right ureteral calculus


Status: Acute  











Review of Systems


Constitutional:  No fever, No chills


ENT:  No sore throat, No trouble swallowing





Medications





Current Inpatient Medications








 Medications


  (Trade)  Dose


 Ordered  Sig/Denise


 Route  Start Time


 Stop Time Status Last Admin


Dose Admin


 


 Ioversol


  (Optiray 320)  100 ml  UD  PRN


 IV  8/1/18 18:15


 8/5/18 18:14   


 


 


 Acetaminophen


  (Tylenol Tab)  650 mg  Q4H  PRN


 PO  8/1/18 22:30


 8/31/18 22:29   


 


 


 Ampicillin Sodium/


 Sulbactam Sodium


  (Consult)  1 ea  UD  PRN


 N/A  8/1/18 23:00


 8/31/18 22:59   


 


 


 Ketorolac


 Tromethamine


  (Toradol Inj)  15 mg  Q6H  PRN


 IV.  8/1/18 22:30


 8/6/18 22:29  8/3/18 01:39


15 MG


 


 Ibuprofen


  (Advil Tab)  400 mg  Q6H  PRN


 PO  8/1/18 22:30


 8/31/18 22:29   


 


 


 Prochlorperazine


 Edisylate 5 mg/


 Syringe  5 ml @ 5


 mls/min  Q6H  PRN


 IV  8/1/18 22:30


 8/31/18 22:29   


 


 


 Tramadol HCl


  (Ultram Tab)  not


 relieved


 by tylenol @   Q6H  PRN


 PO  8/1/18 22:30


 8/31/18 22:29   


 


 


 Miscellaneous


 Information


  (Order Awaiting


 Action)  1 ea  QS


 N/A  8/2/18 00:00


 9/1/18 00:00   


 


 


 Nortriptyline HCl


  (Pamelor Cap)  50 mg  HS


 PO  8/2/18 21:00


 9/1/18 20:59  8/2/18 21:42


50 MG


 


 Ampicillin Sodium/


 Sulbactam Sodium


 3000 mg/Sodium


 Chloride  108 ml @ 


 216 mls/hr  Q6H


 IV  8/2/18 02:00


 8/12/18 01:59  8/3/18 07:30


216 MLS/HR


 


 Pantoprazole


 Sodium


  (Protonix Tab)  40 mg  HS


 PO  8/3/18 22:00


 8/5/18 22:01   


 











Objective


Vital Signs











  Date Time  Temp Pulse Resp B/P (MAP) Pulse Ox O2 Delivery O2 Flow Rate FiO2


 


8/3/18 08:00      Room Air  


 


8/3/18 07:16 36.6 81 20 110/70 (83) 96   


 


8/3/18 00:00      Room Air  


 


8/2/18 23:12 37.0 89 18 130/82 (98) 98 Room Air  


 


8/2/18 20:00     97 Room Air  


 


8/2/18 16:00      Room Air  


 


8/2/18 15:50 36.7 94 18 109/78 (88) 97 Room Air  











Physical Exam


General Appearance:  no apparent distress


ENT:  normal ENT inspection, hearing grossly normal, TMs normal, pharynx normal





Laboratory Results





Last 24 Hours








Test


  8/3/18


06:52


 


White Blood Count 15.67 K/uL 


 


Red Blood Count 4.90 M/uL 


 


Hemoglobin 13.5 g/dL 


 


Hematocrit 39.5 % 


 


Mean Corpuscular Volume 80.6 fL 


 


Mean Corpuscular Hemoglobin 27.6 pg 


 


Mean Corpuscular Hemoglobin


Concent 34.2 g/dl 


 


 


RDW Standard Deviation 40.6 fL 


 


RDW Coefficient of Variation 13.9 % 


 


Platelet Count 107 K/uL 


 


Mean Platelet Volume 10.7 fL 


 


Creatinine 0.92 mg/dl 


 


Est Creatinine Clear Calc


Drug Dose 114.6 ml/min 


 


 


Estimated GFR (


American) 123.6 


 


 


Estimated GFR (Non-


American 106.6 


 











Assessment and Plan


This is a 38 year old male with a past medical history of chronic migraines and 

chronic thrombocytopenia - presents with peritonsillar abscess





Peritonsillar Abscess


8/3


- patient is doing well


- plan is to d/c with Augmentin


- ENT follow-up scheduled for Tuesday, August 7 8/2


- CT of the soft tissue neck shows an 11mm peritonsillar abscess


- spoke with ENT over the phone, this is unlikely to be drained


- to see ENT as an outpatient in one week


- in the meantime, will treat with antibiotics


- Unasyn while inpatient and Augmentin on discharge


- currently on clears and advance as tolerated





Chronic Thrombocytopenia


- platelets stable





Chronic Migraines


- continue home medications





DVT ppx


- ambulation





FULL CODE

## 2018-08-03 NOTE — DISCHARGE INSTRUCTIONS
Discharge Instructions


Date of Service


Aug 3, 2018.





Admission


Reason for Admission:  Sepsis





Discharge


Discharge Diagnosis / Problem:  Peritonsillar Abscess (infection)





Discharge Goals


Goal(s):  Decrease discomfort, Improve function, Diagnostic testing, 

Therapeutic intervention





Activity Recommendations


Activity Limitations:  resume your previous activity





.





Instructions / Follow-Up


Instructions / Follow-Up


Please follow-up with Dr. Parra on Wednesday, August 8 at 1:05PM


Please follow-up with ENT, Dr. Mota, on Tuesday, August 7 at 10:20AM as 

scheduled


* You will be on Augmentin (antibiotics) for another 10 days





Current Hospital Diet


Patient's current hospital diet: Low Fiber Diet





Discharge Diet


Recommended Diet:  Regular Diet (as)





Pending Studies


Studies pending at discharge:  no





Laboratory Results





Hemoglobin A1c








Test


  8/1/18


18:10 Range/Units


 


 


Estimated Average Glucose 97   mg/dl


 


Hemoglobin A1c 5.0  4.5-5.6  %











Medical Emergencies








.


Who to Call and When:





Medical Emergencies:  If at any time you feel your situation is an emergency, 

please call 911 immediately.





.





Non-Emergent Contact


Non-Emergency issues call your:  Primary Care Provider





.


.








"Provider Documentation" section prepared by Jae Duran.








.

## 2018-08-03 NOTE — DISCHARGE SUMMARY
Discharge Summary


Date of Service


Aug 3, 2018.





Discharge Summary


Admission Date:


Aug 1, 2018 at 21:47


Discharge Date:  Aug 3, 2018


Discharge Disposition:  Home


Principal Diagnosis:


Sepsis


Peritonsillar Abscess





Chronic Thrombocytopenia





Chronic Migraines





Medication Reconciliation


New Medications:  


Amoxicillin & Pot Clavulanate (Augmentin 875-125 mg) 1 Tab Tab


1 TAB PO BID for 10 Days, #20 TAB





Pantoprazole (Pantoprazole Sodium) 40 Mg Tab


40 MG PO HS for 10 Days, #10 TAB





 


Continued Medications:  


Acetaminophen (Tylenol) 500 Mg Tab


500 MG PO UD PRN for Pain or Fever, TAB


TAKE PER PACKAGE DIRECTIONS


Eletriptan Hydrobromide (Eletriptan Hydrobromide) 40 Mg Tab


40 MG PO UD PRN for Migraine











Admission Information


HPI (per Admitting provider):


DATE OF ADMISSION:  08/01/2018


 


PRIMARY CARE PHYSICIAN:  Dr. Parra.


 


CHIEF COMPLAINT:  Sore throat.


 


HISTORY OF PRESENT ILLNESS:  





Medical history is significant for chronic migraine, urolithiasis, chronic 

thrombocytopenia.  





Since childhood, the patient has had recurrent sore throats (about three 

episodes a year) -


most episodes resolving without antibiotic prescription.


Last episode was  last month.  





Yesterday sore throat noted more on on the right side with R otalgia, 

odynophagia, fever,


chills.   


No chest pain, no shortness of breath.  





At the Emergency Room, patient received Unasyn for sepsis.


 


MEDICAL HISTORY:  As above.


 


SURGERIES:  Urologic procedures.


 


HOME MEDICATIONS:  Include Nortriptyline, Omeprazole, Relpax p.r.n.


 


ALLERGIES:  DUST MITE.


 


FAMILY HISTORY:  There is a family history of recurrent sore throat.


 


PERSONAL AND SOCIAL HISTORY:  Nonsmoker, no chronic intake of alcoholic


beverages.  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


 


REVIEW OF SYSTEMS:  As per HPI, all 10 systems reviewed, all other ROS


negative.


 


PHYSICAL EXAMINATION:


VITAL SIGNS:  Blood pressure was noted to be 113/79, pulse rate 154, later


101, RR 18, temperature 39.7, sats 96 on room air.


GENERAL:  Noted to be pleasant and comfortable, no respiratory distress.  No


stridor.


SKIN:  Normal color, warm.


HEENT:  Pink palpebral conjunctivae.  No ptosis, dry mucosa.  Enlarged tonsil


on the right with right peritonsillar swelling, R tonsillar exudate noted.  


No trismus. 


NECK:  Supple, some submandibular adenopathy noted.


CHEST:  Clear to auscultation, no tenderness.


HEART:  Tachycardic.  No murmur.


ABDOMEN:  Soft, nontender.


EXTREMITIES:  No edema.  No gross deformities, no tenderness.


NEUROLOGIC:  Coherent, no gross focality.


 


LABORATORY DATA:  Hemoglobin 15.7, hematocrit 45.8, white cell count 17.3,


platelets 97 (immature granulocytes 0.07, monocytes 1.13, decreased platelet


estimate).  Sodium 136, potassium 3.9, chloride 103,  BUN 60,


creatine 1.2, glucose 114.  Lactic acid was noted to be 2.1.


 





CT neck shows subtle 11-mm right tonsillar hypodensity consistent with 

developing phlegmon/abscess. 





Chest x-ray, no active disease.  





EKG as per my interpretation, rate 145, sinus tachycardia,


no ischemia.  





ASSESSMENT:


1.  Severe sepsis 


SIRS plus lactic acid elevation secondary to peritonsillar abscess.


2.  Chronic migraine, stable 


3.  Hyperglycemia, rule out DM.


4.  Chronic thrombocytopenia with abnormal WBC differential count


rule out blood dyscrasia.


 


PLAN:  


GMF


CS, IV Unasyn.


IVF, follow lactic acid


ENT consult, peritonsillar abscess, recurrent tonsillitis.  


Peripheral blood smear.  


Check hemoglobin A1c.


DVT prophylaxis, SCDs RE thrombocytopenia.  


Full code.





Hospital Course


This is a 38 year old male with a past medical history of chronic migraines and 

chronic thrombocytopenia - presents with peritonsillar abscess





Peritonsillar Abscess


8/3


- patient is doing well


- plan is to d/c with Augmentin


- ENT follow-up scheduled for Tuesday, August 7 8/2


- CT of the soft tissue neck shows an 11mm peritonsillar abscess


- spoke with ENT over the phone, this is unlikely to be drained


- to see ENT as an outpatient in one week


- in the meantime, will treat with antibiotics


- Unasyn while inpatient and Augmentin on discharge


- currently on clears and advance as tolerated





Chronic Thrombocytopenia


- platelets stable





Chronic Migraines


- continue home medications





DVT ppx


- ambulation





FULL CODE


Total time spent on discharge = 35 minutes


This includes examination of the patient, discharge planning, medication 

reconciliation, and communication with other providers.





Discharge Instructions


Please follow-up with Dr. Parra on Wednesday, August 8 at 1:05PM


Please follow-up with ENT, Dr. Mota, on Tuesday, August 7 at 10:20AM as 

scheduled


* You will be on Augmentin (antibiotics) for another 10 days
